# Patient Record
(demographics unavailable — no encounter records)

---

## 2024-10-18 NOTE — HISTORY OF PRESENT ILLNESS
[de-identified] : Ms. CATE ACOSTA is a 69 year old female presenting for evaluation of 10 days of right knee pain. She reports twisting the knee and hearing a pop. Patient then went to the ER and was sent home with meloxicam. She followed up with her PCP who sent her for an MRI revealing a medial and lateral meniscus tear. Patient has pain localzied mostly medially worse with all weightbearing. No injections or PT thus far.

## 2024-10-18 NOTE — PHYSICAL EXAM
[de-identified] :  The patient appears well nourished and in no apparent distress. The patient is alert and oriented to person, place, and time. Affect and mood appear normal. The head is normocephalic and atraumatic. The eyes reveal normal sclera and extra ocular muscles are intact. The tongue is midline with no apparent lesions. Skin shows normal turgor with no evidence of eczema or psoriasis. No respiratory distress noted. Sensation grossly intact. [de-identified] :  Exam of the right knee shows she has full extension. There is severe pain with flexion. 5/5 motor strength bilaterally distally. Sensation intact distally. [de-identified] : X-ray: 4 views of the right knee demonstrate moderate-severe varus arthritis.

## 2024-10-18 NOTE — CONSULT LETTER
[Dear  ___] : Dear  [unfilled], [Consult Letter:] : I had the pleasure of evaluating your patient, [unfilled]. [Please see my note below.] : Please see my note below. [Consult Closing:] : Thank you very much for allowing me to participate in the care of this patient.  If you have any questions, please do not hesitate to contact me. [Sincerely,] : Sincerely, [FreeTextEntry2] : MAURO VALENTINE MD [FreeTextEntry3] : Jeffrey Khalil MD Chief of Joint Replacement Primary & Revision Hip and Knee Replacement  [facility]  - EKG on admission without significant ischemic changes and pt had nausea / discomfort yesterday but no chest pain.  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  - cardiology consulted, Dr Driscoll, recs appreciated none

## 2024-10-18 NOTE — DISCUSSION/SUMMARY
[de-identified] :  CATE ACOSTA is a 69 year old female who presents with right knee moderate varus arthritis and a medial meniscus tear.  Nonoperative treatment options for knee arthritis were discussed including anti-inflammatories, physical therapy, intraarticular cortisone injections, and hyaluronic acid gel injections. We discussed that her radiating pain down to the foot may be more consistent with spinal etiology. If she does not get any relief of that component of her pain from the cortisone injection, she may have to follow up with a spinal specialist. The patient received a cortisone injection in the right knee in the office today. The patient will follow up 6 weeks post injection.

## 2024-10-18 NOTE — PROCEDURE
[de-identified] : Using sterile technique, 2cc of depomedrol 40mg/ml, 4cc of 1% plain lidocaine, and 2 cc 0.25% marcaine was drawn up into a sterile syringe. The right knee was then sterilely prepped with chlorhexidine. Ethyl chloride spray was used to anesthetize the skin and subQ tissue. The depomedrol/lidocaine/marcaine mixture was then injected into the knee joint in the anterolateral position. The patient tolerated the procedure well without difficulty. The patient was given instructions on the use of ice and anti-inflammatories post injection site soreness.

## 2024-10-31 NOTE — DISCUSSION/SUMMARY
[FreeTextEntry1] : IMP  Morbid Obesity Moderate JOCELYNE  Plan  Weight loss CPAP titration 6-week FU

## 2024-10-31 NOTE — CONSULT LETTER
[Dear  ___] : Dear  [unfilled], [Consult Letter:] : I had the pleasure of evaluating your patient, [unfilled]. [Please see my note below.] : Please see my note below. [Consult Closing:] : Thank you very much for allowing me to participate in the care of this patient.  If you have any questions, please do not hesitate to contact me. [Sincerely,] : Sincerely, [DrAleksey  ___] : Dr. STARK

## 2024-11-22 NOTE — HISTORY OF PRESENT ILLNESS
[de-identified] : Ms. CATE ACOSTA is a 69 year old female presenting for follow up evaluation of right knee pain. She reports twisting the knee and hearing a pop. She followed up with her PCP who sent her for an MRI revealing a medial and lateral meniscus tear. Patient has pain localized mostly medially worse with all weightbearing. Patient had a steroid injection to the right knee 6 weeks ago. She reports great relief with this. She has also been going to PT which is helping.

## 2024-11-22 NOTE — DISCUSSION/SUMMARY
[de-identified] :  CATE ACOSTA is a 69 year old female who presents with right knee moderate varus arthritis and a medial meniscus tear.  Nonoperative treatment options for knee arthritis were discussed including anti-inflammatories, physical therapy, intraarticular cortisone injections, and hyaluronic acid gel injections. Patient will continue with PT. She is doing much better following recent steroid injection. Follow up as needed.  29-Mar-2019 12:55

## 2024-11-22 NOTE — PHYSICAL EXAM
[de-identified] :  The patient appears well nourished and in no apparent distress. The patient is alert and oriented to person, place, and time. Affect and mood appear normal. The head is normocephalic and atraumatic. The eyes reveal normal sclera and extra ocular muscles are intact. The tongue is midline with no apparent lesions. Skin shows normal turgor with no evidence of eczema or psoriasis. No respiratory distress noted. Sensation grossly intact. [de-identified] :  Exam of the right knee shows she has full extension. There is severe pain with flexion. 5/5 motor strength bilaterally distally. Sensation intact distally. [de-identified] : Prior X-ray: 4 views of the right knee demonstrate moderate-severe varus arthritis.

## 2025-01-22 NOTE — DISCUSSION/SUMMARY
[FreeTextEntry1] : IMP  Morbid Obesity Moderate JOCELYNE Compliant with and benefiting from nocturnal BiPAP  Plan  Weight loss Full Face Bipap 12-month FU

## 2025-02-14 NOTE — ASSESSMENT
[FreeTextEntry1] : Past medical history is significant for TIA, HTN, hypercholesterolemia, obesity, and CAD.  Currently on plavix, Rouvastatin, amlodipine, clopigrel, Losartan, repatha Recently cholesterol 287  Goal Weight: Patient Current weight:227-->234-->237-->233-->228-->235 BMI: 40.21-->41.5-->42.0-->41.2-->40.3-->41.6  BARIATRIC SURGERY HISTORY: YES OBESITY COMORBIDITIES: none CURRENT ANTI-OBESITY MEDICATIONS: OBESITY MEDICATION SIDE EFFECTS: HISTORY OF WEIGHT LOSS MEDICATIONS:  Nutrition: consistent, Breakfast: coffee, poached egg w/ toast,oatmeal, farina Lunch: protein drink, chicken salad, tomatoes salad Dinner:chicken, fish, chicken soup, lentil soup. Snacks: fruit Drinks: water, coffee w/ splenda  Physical activity- n/A Medication compliance-n/A   Plan of Care: Bloodwork discussed with patient Wegovy Rx sent. Risks include but are not limited to nausea, vomiting, constipation, diarrhea, and Gi upset. Contraindications include Pancreatitis, MENS type 2 and medullary thyroid cancer, and pregnancy. Pt. verbalize understanding and wishes to proceed with medical therapy. Instructions for use provided via office demonstration. Discussed shortage and insurance limitations. Patient aware of the Prescription being sent to [facility]. Whole food eat based strategy- low fat diet discharge

## 2025-02-14 NOTE — REASON FOR VISIT
[Home] : at home, [unfilled] , at the time of the visit. [Medical Office: ([facility])___] : at the medical office located in  [Telehealth (audio & video)] : This visit was provided via telehealth using real-time 2-way audio visual technology. [Follow-Up] : a follow-up visit

## 2025-04-24 NOTE — DISCUSSION/SUMMARY
[de-identified] :  CATE ACOSTA is a 69 year old female who presents with right knee moderate to advanced varus arthritis and a degenerative medial meniscus tear.  Nonoperative treatment options for knee arthritis were discussed including anti-inflammatories, physical therapy, intraarticular cortisone injections, and hyaluronic acid gel injections.  She previously responded well to a cortisone injection given in October 2024.  We did proceed with a repeat cortisone injection for the right knee in the office today.  This was tolerated well.  I recommend use of Tylenol, ice and elevation as needed for discomfort.  She will follow-up with Dr. Khalil in 6 weeks.  All questions answered.

## 2025-04-24 NOTE — HISTORY OF PRESENT ILLNESS
[de-identified] : Ms. CATE ACOSTA is a 69 year old female presenting for follow up evaluation of right knee pain.  She has been pleased under the care of Dr. Khalil.  She has been diagnosed with moderate to advanced medial patellofemoral compartment osteoarthritis.  She status post previous right knee arthroscopy for torn meniscus years ago. She did undergo a corticosteroid injection on 10/18/2024.  She states that her knee has been doing very well until 2 days ago.  There is no recent injury although she has been doing some more work around the house which has included repetitive stairs.  She presents today with complaint of intermittent, moderate dull aching and throbbing pain localized to the medial aspect of the knee with mild radicular pain down the lower leg.  Exacerbated standing and walking.  She reports mild stiffness.  No catching, locking or buckling.  No paresthesia.  Treatment has consisted of activity modification, ice and elevation without improvement.  She has been weightbearing with use of a cane secondary to her pain. She does have a history of cardiac stent, currently taking Plavix.

## 2025-04-24 NOTE — PROCEDURE
[de-identified] : I injected the RIGHT knee.  I discussed at length with the patient the planned steroid and lidocaine injection. The risks, benefits, convalescence and alternatives were reviewed. The possible side effects discussed included but were not limited to: pain, swelling, heat, bleeding, and redness. Symptoms are generally mild but if they are extensive then contact the office. Giving pain relievers by mouth such as NSAIDs or Tylenol can generally treat the reactions to steroid and lidocaine. Rare cases of infection have been noted. Rash, hives and itching may occur post injection. If you have muscle pain or cramps, flushing and or swelling of the face, rapid heart beat, nausea, dizziness, fever, chills, headache, difficulty breathing, swelling in the arms or legs, or have a prickly feeling of your skin, contact a health care provider immediately. Following this discussion, the knee was prepped with Alcohol and under sterile condition the 80 mg Depo-Medrol and 6 cc Lidocaine injection was performed with a 20 gauge needle through a infralateral injection site. The needle was introduced into the joint, aspiration was performed to ensure intra-articular placement and the medication was injected. Upon withdrawal of the needle the site was cleaned with alcohol and a band aid applied. The patient tolerated the injection well and there were no adverse effects. Post injection instructions included no strenuous activity for 24 hours, cryotherapy and if there are any adverse effects to contact the office.

## 2025-04-24 NOTE — PHYSICAL EXAM
[de-identified] :  The patient appears well nourished and in no apparent distress. The patient is alert and oriented to person, place, and time. Affect and mood appear normal. The head is normocephalic and atraumatic. The eyes reveal normal sclera and extra ocular muscles are intact. The tongue is midline with no apparent lesions. Skin shows normal turgor with no evidence of eczema or psoriasis. No respiratory distress noted. Sensation grossly intact. [de-identified] :  Exam of the right knee shows she has full extension. There is severe pain with flexion.  Range of motion is 0 to 100 degrees.  There is focal medial joint line tenderness as well as tenderness over the pes bursa. 5/5 motor strength bilaterally distally. Sensation intact distally. [de-identified] : Prior X-ray: 4 views of the right knee demonstrate moderate-severe varus arthritis.

## 2025-06-18 NOTE — COUNSELING
[Potential consequences of obesity discussed] : Potential consequences of obesity discussed [Benefits of weight loss discussed] : Benefits of weight loss discussed [Structured Weight Management Program suggested:] : Structured weight management program suggested [Encouraged to maintain food diary] : Encouraged to maintain food diary [Encouraged to increase physical activity] : Encouraged to increase physical activity [Cessation strategies including cessation program discussed] : Cessation strategies including cessation program discussed [Use of nicotine replacement therapies and other medications discussed] : Use of nicotine replacement therapies and other medications discussed [Encouraged to pick a quit date and identify support needed to quit] : Encouraged to pick a quit date and identify support needed to quit [ - Annual Lung Cancer Screening/Share Decision Making Discussion] : Annual Lung Cancer Screening/Share Decision Making Discussion. (I have advised this patient to have a Low Dose CT (LDCT) scan of the lungs and have discussed the following with the patient in a shared decision making discussion:   Benefits of Detection and Early Treatment: There is adequate evidence that annual screening for lung cancer with LDCT in a population of high-risk persons can prevent a substantial number of lung cancer-related deaths. The magnitude of benefit depends on the individual patient's risk for lung cancer, as those who are at highest risk are most likely to benefit. Screening cannot prevent most lung cancer-related deaths, and does not replace smoking cessation. Harms of Detection and Early Intervention and Treatment: The harms associated with LDCT screening include false-negative and false-positive results, incidental findings, over diagnosis, and radiation exposure. False-positive LDCT results occur in a substantial proportion of screened persons; 95% of all positive results do not lead to a diagnosis of cancer. In a high-quality screening program, further imaging can resolve most false-positive results; however, some patients may require invasive procedures. Radiation harms, including cancer resulting from cumulative exposure to radiation, vary depending on the age at the start of screening; the number of scans received; and the person's exposure to other sources of radiation, particularly other medical imaging.)

## 2025-08-14 NOTE — PHYSICAL EXAM
[Chaperoned Physical Exam] : A chaperone was present in the examining room during all aspects of the physical examination. [MA] : MA [FreeTextEntry2] : Shyann [Appropriately responsive] : appropriately responsive [Alert] : alert [No Acute Distress] : no acute distress [Soft] : soft [Non-tender] : non-tender [Non-distended] : non-distended [No HSM] : No HSM [No Lesions] : no lesions [No Mass] : no mass [Oriented x3] : oriented x3 [Examination Of The Breasts] : a normal appearance [No Masses] : no breast masses were palpable [Labia Majora] : normal [Labia Minora] : normal [Normal] : normal [Absent] : absent [Uterine Adnexae] : normal [FreeTextEntry8] : negative

## 2025-08-14 NOTE — HISTORY OF PRESENT ILLNESS
[N] : Patient reports normal menses [Y] : Positive pregnancy history [Menarche Age: ____] : age at menarche was [unfilled] [No] : Patient does not have concerns regarding sex [TextBox_4] : Well woman visit  No complaints  Patient had cardiac stent placement 12/224 [Mammogramdate] : 06/24/24 [TextBox_19] : BR-2 [BreastSonogramDate] : 06/24/24 [TextBox_25] : BR-2 [PapSmeardate] : 06/13/24 [TextBox_31] : NEG [BoneDensityDate] : 06/24/24 [TextBox_37] : OSTEOPENIA [ColonoscopyDate] : 2022 [TextBox_43] : Polyps removed, benign per pt, f/u in 3yrs.  [HPVDate] : 06/13/24 [TextBox_78] : NEG [LMPDate] : 1984 [PGxTotal] : 1 [[facility] Hospital[facility]] : 1 [[facility] Center[facility]] : 1 [FreeTextEntry1] : 1984 [FreeTextEntry4] : denies abuse

## 2025-08-14 NOTE — HISTORY OF PRESENT ILLNESS
[N] : Patient reports normal menses [Y] : Positive pregnancy history [Menarche Age: ____] : age at menarche was [unfilled] [No] : Patient does not have concerns regarding sex [TextBox_4] : Well woman visit  No complaints  Patient had cardiac stent placement 12/224 [Mammogramdate] : 06/24/24 [TextBox_19] : BR-2 [BreastSonogramDate] : 06/24/24 [TextBox_25] : BR-2 [PapSmeardate] : 06/13/24 [TextBox_31] : NEG [BoneDensityDate] : 06/24/24 [TextBox_37] : OSTEOPENIA [ColonoscopyDate] : 2022 [TextBox_43] : Polyps removed, benign per pt, f/u in 3yrs.  [HPVDate] : 06/13/24 [TextBox_78] : NEG [LMPDate] : 1984 [PGxTotal] : 1 [[facility] HospitalxFulerm] : 1 [[facility] Centeriving] : 1 [FreeTextEntry1] : 1984 [FreeTextEntry4] : denies abuse

## 2025-08-14 NOTE — PLAN
[FreeTextEntry1] : #Well Woman Visit 1. Nutrition/Activity: The benefits of balanced diet and physical activity discussed with patient. 2. Health Screening: She was informed of the benefits of a screening colonoscopy/DEXA/Mammo/Pap. - Cervix: We reviewed ASCCP/ACOG guidelines for pap smear screening. Last Pap June 2024, NILM; HPV: NEG. Discussed guidelines. Pt is ineligible due to age. - Breast: last mammogram and Sonogram June 2024, BR1. - Colon: 2022. Recommended seeing PCP for endoscopy and colonoscopy referral. -Bone density 10/24: osteopenia. Script provided for screening.